# Patient Record
Sex: FEMALE | Race: WHITE | Employment: UNEMPLOYED | URBAN - METROPOLITAN AREA
[De-identification: names, ages, dates, MRNs, and addresses within clinical notes are randomized per-mention and may not be internally consistent; named-entity substitution may affect disease eponyms.]

---

## 2022-04-19 ENCOUNTER — HOSPITAL ENCOUNTER (EMERGENCY)
Age: 27
Discharge: HOME | End: 2022-04-19
Attending: EMERGENCY MEDICINE
Payer: MEDICAID

## 2022-04-19 ENCOUNTER — APPOINTMENT (OUTPATIENT)
Dept: GENERAL RADIOLOGY | Age: 27
End: 2022-04-19
Payer: MEDICAID

## 2022-04-19 VITALS
OXYGEN SATURATION: 99 % | HEART RATE: 69 BPM | TEMPERATURE: 97.7 F | SYSTOLIC BLOOD PRESSURE: 120 MMHG | RESPIRATION RATE: 16 BRPM | WEIGHT: 180 LBS | HEIGHT: 61 IN | DIASTOLIC BLOOD PRESSURE: 72 MMHG | BODY MASS INDEX: 33.99 KG/M2

## 2022-04-19 DIAGNOSIS — J45.901 EXACERBATION OF ASTHMA, UNSPECIFIED ASTHMA SEVERITY, UNSPECIFIED WHETHER PERSISTENT: Primary | ICD-10-CM

## 2022-04-19 LAB
HCG QUALITATIVE: NEGATIVE
RAPID INFLUENZA  B AGN: NEGATIVE
RAPID INFLUENZA A AGN: NEGATIVE
SARS-COV-2, NAAT: NOT DETECTED

## 2022-04-19 PROCEDURE — 87635 SARS-COV-2 COVID-19 AMP PRB: CPT

## 2022-04-19 PROCEDURE — 93005 ELECTROCARDIOGRAM TRACING: CPT | Performed by: EMERGENCY MEDICINE

## 2022-04-19 PROCEDURE — 87804 INFLUENZA ASSAY W/OPTIC: CPT

## 2022-04-19 PROCEDURE — 71046 X-RAY EXAM CHEST 2 VIEWS: CPT

## 2022-04-19 PROCEDURE — 84703 CHORIONIC GONADOTROPIN ASSAY: CPT

## 2022-04-19 PROCEDURE — 6370000000 HC RX 637 (ALT 250 FOR IP): Performed by: EMERGENCY MEDICINE

## 2022-04-19 PROCEDURE — 99285 EMERGENCY DEPT VISIT HI MDM: CPT

## 2022-04-19 RX ORDER — PREDNISONE 20 MG/1
60 TABLET ORAL DAILY
Qty: 15 TABLET | Refills: 0 | Status: SHIPPED | OUTPATIENT
Start: 2022-04-19 | End: 2022-04-24

## 2022-04-19 RX ORDER — IPRATROPIUM BROMIDE AND ALBUTEROL SULFATE 2.5; .5 MG/3ML; MG/3ML
1 SOLUTION RESPIRATORY (INHALATION)
Status: DISCONTINUED | OUTPATIENT
Start: 2022-04-19 | End: 2022-04-19 | Stop reason: HOSPADM

## 2022-04-19 RX ORDER — BENZONATATE 100 MG/1
100 CAPSULE ORAL 3 TIMES DAILY PRN
Qty: 15 CAPSULE | Refills: 0 | Status: SHIPPED | OUTPATIENT
Start: 2022-04-19 | End: 2022-04-22

## 2022-04-19 RX ORDER — ALBUTEROL SULFATE 90 UG/1
2 AEROSOL, METERED RESPIRATORY (INHALATION) EVERY 6 HOURS PRN
Qty: 18 G | Refills: 3 | Status: SHIPPED | OUTPATIENT
Start: 2022-04-19

## 2022-04-19 RX ADMIN — IPRATROPIUM BROMIDE AND ALBUTEROL SULFATE 1 AMPULE: .5; 3 SOLUTION RESPIRATORY (INHALATION) at 16:08

## 2022-04-19 ASSESSMENT — PAIN SCALES - GENERAL: PAINLEVEL_OUTOF10: 4

## 2022-04-19 ASSESSMENT — PAIN - FUNCTIONAL ASSESSMENT: PAIN_FUNCTIONAL_ASSESSMENT: 0-10

## 2022-04-19 ASSESSMENT — PAIN DESCRIPTION - PAIN TYPE: TYPE: ACUTE PAIN

## 2022-04-19 NOTE — ED PROVIDER NOTES
[facility] Emergency Department    CHIEF COMPLAINT  Chest Pain (started 2 days, with a sore thoat, pt feels like when she take a deep breath she is wheezy )    I have seen this patient in collaboration with attending physician, Dr. Gregory Palm. HISTORY OF PRESENT ILLNESS  Alfonso Palacios is a 27 y.o. female who presents to the ED complaining of 3 to 4-day history of URI-like symptoms. Patient reports onset when moving into new house. States that she is concerned with some mold in fiberglass. Does have history of asthma. Non-smoker. Reports some chest tightness and discomfort. History of asthma. Cough is nonproductive. No hemoptysis. No pain with deep inspiration. Does not feel any dizziness confusion. Mild headaches. Nauseous without vomiting. No diarrhea or constipation. No black or bloody stools. Reports that 2 of her children that are here with her today are experiencing similar symptoms. She has 2 additional children with her who are requiring school notes to return. No other complaints, modifying factors or associated symptoms. Nursing notes reviewed. Past Medical History:   Diagnosis Date    Asthma     AS A BABY     Past Surgical History:   Procedure Laterality Date    INDUCED       x2     History reviewed. No pertinent family history.   Social History     Socioeconomic History    Marital status: Single     Spouse name: Not on file    Number of children: Not on file    Years of education: Not on file    Highest education level: Not on file   Occupational History    Not on file   Tobacco Use    Smoking status: Current Some Day Smoker     Packs/day: 0.50    Smokeless tobacco: Former User     Quit date: 10/14/2013   Substance and Sexual Activity    Alcohol use: Yes     Comment: every 2 weeks    Drug use: No    Sexual activity: Not on file   Other Topics Concern    Not on file   Social History Narrative    Not on file     Social Determinants of Health     Financial Resource Strain:     Difficulty of Paying Living Expenses: Not on file   Food Insecurity:     Worried About Running Out of Food in the Last Year: Not on file    [place] of Food in the Last Year: Not on file   Transportation Needs:     Lack of Transportation (Medical): Not on file    Lack of Transportation (Non-Medical):  Not on file   Physical Activity:     Days of Exercise per Week: Not on file    Minutes of Exercise per Session: Not on file   Stress:     Feeling of Stress : Not on file   Social Connections:     Frequency of Communication with Friends and Family: Not on file    Frequency of Social Gatherings with Friends and Family: Not on file    Attends [employer] Services: Not on file    Active Member of [address] [place] [address] [employer] or Organizations: Not on file    Attends Club or Organization Meetings: Not on file    Marital Status: Not on file   Intimate Partner Violence:     Fear of Current or Ex-Partner: Not on file    Emotionally Abused: Not on file    Physically Abused: Not on file    Sexually Abused: Not on file   Housing Stability:     Unable to Pay for Housing in the Last Year: Not on file    Number of [place] in the Last Year: Not on file    Unstable Housing in the Last Year: Not on file     Current Facility-Administered Medications   Medication Dose Route Frequency Provider Last Rate Last Admin    ipratropium-albuterol (DUONEB) nebulizer solution 1 ampule  1 ampule Inhalation Q4H WA Marcello Galeazzi, DO   1 ampule at 04/19/22 1608     Current Outpatient Medications   Medication Sig Dispense Refill    ibuprofen (ADVIL;MOTRIN) 800 MG tablet Take 1 tablet by mouth every 8 hours as needed for Pain 30 tablet 0    dicyclomine (BENTYL) 20 MG tablet Take 1 tablet by mouth every 6 hours as needed (abdominal pain) 20 tablet 0    ondansetron (ZOFRAN ODT) 4 MG disintegrating tablet Take 1 tablet by mouth every 8 hours as needed for Nausea 20 tablet 0    prenatal vitamin (VOL-PLUS) 27-1 MG TABS TABLET Take 1 tablet by mouth daily. 30 tablet 8    Prenatal MV-Min-Fe Fum-FA-DHA (PRENATAL 1 PO) Take  by mouth.  famotidine (PEPCID) 20 MG tablet Take 1 tablet by mouth 2 times daily for 10 days. 20 tablet 0    famotidine (PEPCID) 20 MG tablet Take 1 tablet by mouth 2 times daily for 30 days. 60 tablet 0     Allergies   Allergen Reactions    Latex Rash       REVIEW OF SYSTEMS  6 systems reviewed, pertinent positives per HPI otherwise noted to be negative    PHYSICAL EXAM  BP (!) 118/100   Pulse 54   Temp 97.7 °F (36.5 °C) (Oral)   Resp 18   Ht 5' 1\" (1.549 m)   Wt 180 lb (81.6 kg)   LMP 04/18/2022   SpO2 97%   BMI 34.01 kg/m²   GENERAL APPEARANCE: Awake and alert. Cooperative. No acute distress. HEAD: Normocephalic. Atraumatic. EYES: PERRL. EOM's grossly intact. Conjunctiva clear without exudate. ENT: Mucous membranes are moist.  Oropharynx is without erythema, edema, or exudate. Uvula is midline without unilateral swelling. Floor the mouth soft and nonraised. No trismus appreciated. Patient is controlling secretions appropriately. Nasal turbinates unremarkable and nares patent bilaterally. No pain with manipulation of the external ears. No mastoid tenderness. Canals are clear without edema or exudate. TMs are pink and pearly without bulge, retraction, or loss of landmarks. No signs of TM rupture. LYMPH: No periauricular, submental, or cervical lymphadenopathy. NECK: Supple. Normal ROM. No JVD. No tracheal tenderness or deviation. No crepitus. CHEST: Equal symmetric chest rise. Heart is regular rate and rhythm without murmur, rub, or gallop. Lung fields clear to auscultation bilaterally without wheezes, rhonchi, rales. LUNGS: Breathing is unlabored. Speaking comfortably in full sentences. No nasal flaring, retractions, or use of accessory muscles. Lung fields are clear auscultation bilaterally without wheezes, rhonchi, rales.   No dullness or hyperresonance to percussion. Abdomen: Nondistended and nontender. EXTREMITIES: MAEE. No acute deformities. Distal pulses +2 and cap refill less than 5 seconds. SKIN: Warm and dry. No rashes, clubbing, or cyanosis. NEUROLOGICAL: Alert and oriented. Strength is 5/5 in all extremities and sensation is intact. RADIOLOGY  XR CHEST (2 VW)    Result Date: 4/19/2022  EXAMINATION: TWO XRAY VIEWS OF THE CHEST 4/19/2022 2:55 pm COMPARISON: None. HISTORY: ORDERING SYSTEM PROVIDED HISTORY: cough TECHNOLOGIST PROVIDED HISTORY: Reason for exam:->cough Reason for Exam: cp; cough FINDINGS: The lungs are clear, no effusion. No pneumothorax. Heart is normal size. Mediastinal and hilar contours are within normal limits. Bony thorax no acute abnormality. 1. No acute cardiopulmonary abnormality. ED COURSE   Patient received DuoNeb for chest tightness, with good relief. Triage vitals stable. Stable portable chest x-ray. EKG normal sinus rhythm without evidence for acute ischemic change. hCG negative. COVID-negative. Flu negative. Patient feeling better on reevaluation. Will discharge with medications for treatment of asthma exacerbation. Have discussed return precautions as well as recommendations for follow-up otherwise and patient is in agreement and comfortable with discharge. A discussion was had with MsAutumn Adrianne Rosario regarding URI-like symptoms, ED findings and recommendations for follow-up. All questions were answered. Patient will follow up with PCP in 2 to 3 days for further evaluation/treatment. Patient will return to ED for new/worsening symptoms. Patient was sent home with a prescription for Proventil HFA, prednisone, and Tessalon.     MDM  Results for orders placed or performed during the hospital encounter of 04/19/22   COVID-19, Rapid    Specimen: Nasopharyngeal Swab   Result Value Ref Range    SARS-CoV-2, NAAT Not Detected Not Detected   Rapid influenza A/B antigens    Specimen: Nasopharyngeal   Result Value Ref Range    Rapid Influenza A Ag Negative Negative    Rapid Influenza B Ag Negative Negative   HCG Qualitative, Serum   Result Value Ref Range    hCG Qual Negative Detects HCG level >10 MIU/mL     I estimate there is LOW risk for ACUTE CORONARY SYNDROME, INTRACRANIAL HEMORRHAGE, MALIGNANT DYSRHYTHMIA or HYPERTENSION, PULMONARY EMBOLISM, SEPSIS, SUBARACHNOID HEMORRHAGE, SUBDURAL HEMATOMA, STROKE, or THORACIC AORTIC DISSECTION, thus I consider the discharge disposition reasonable. Crispin Murry and I have discussed the diagnosis and risks, and we agree with discharging home to follow-up with their primary doctor. We also discussed returning to the Emergency Department immediately if new or worsening symptoms occur. We have discussed the symptoms which are most concerning (e.g., bloody sputum, fever, worsening pain or shortness of breath, vomiting, weakness) that necessitate immediate return. Final Impression  1. Exacerbation of asthma, unspecified asthma severity, unspecified whether persistent      Blood pressure (!) 118/100, pulse 54, temperature 97.7 °F (36.5 °C), temperature source Oral, resp. rate 18, height 5' 1\" (1.549 m), weight 180 lb (81.6 kg), last menstrual period 04/18/2022, SpO2 97 %, unknown if currently breastfeeding. DISPOSITION  Patient was discharged to home in good condition.           Marleni Bess, [place]  04/19/22 6004

## 2022-04-20 LAB
EKG ATRIAL RATE: 58 BPM
EKG DIAGNOSIS: NORMAL
EKG P AXIS: 36 DEGREES
EKG P-R INTERVAL: 158 MS
EKG Q-T INTERVAL: 426 MS
EKG QRS DURATION: 86 MS
EKG QTC CALCULATION (BAZETT): 418 MS
EKG R AXIS: 55 DEGREES
EKG T AXIS: 17 DEGREES
EKG VENTRICULAR RATE: 58 BPM

## 2022-04-20 PROCEDURE — 93010 ELECTROCARDIOGRAM REPORT: CPT | Performed by: INTERNAL MEDICINE
